# Patient Record
(demographics unavailable — no encounter records)

---

## 2025-01-07 NOTE — PHYSICAL EXAM
[No Proptosis] : no proptosis [No Lid Lag] : no lid lag [Thyroid Not Enlarged] : the thyroid was not enlarged [No Thyroid Nodules] : no palpable thyroid nodules [No Respiratory Distress] : no respiratory distress [Clear to Auscultation] : lungs were clear to auscultation bilaterally [Normal to Percussion] : lungs were normal to percussion [Normal PMI] : the apical impulse was normal [Normal Rate] : heart rate was normal [Regular Rhythm] : with a regular rhythm [No Edema] : no peripheral edema [Normal Bowel Sounds] : normal bowel sounds [Not Tender] : non-tender [Soft] : abdomen soft [Normal Gait] : normal gait [No Joint Swelling] : no joint swelling seen [Normal Reflexes] : deep tendon reflexes were 2+ and symmetric [No Tremors] : no tremors [Oriented x3] : oriented to person, place, and time [Normal Insight/Judgement] : insight and judgment were intact [de-identified] : significant bald spot on crown of head  Statement Selected

## 2025-01-07 NOTE — HISTORY OF PRESENT ILLNESS
[FreeTextEntry1] : Patient with long-standing history of hypothyroidism and  her dose was lowered from 125 to Synthroid 112 mcg daily. She is compliant with this medication. She has a small nodular goiter and describes no local neck discomfort or hoarseness. Her last sonogram in march 2019 was stable;this was done sooner because a PET scan done for w/u of lung nodule showed diffuse uptake in her thyroid.. She has noted thinning of her hair over the years and has been taking biotin but has stopped.She has mild hyperlipidemia. She was recently found to have an abnormally elevated kappa level and was diagnosed with IgM MGUS. Last visit her potassium was 5.8 so was repeated and is 5.2. BD does show osteoporosis and she did have a wrist and pelvic fracture.  She did not start risedronate.  Past few mos has had significant hair loss on scalp. Saw derm and has been receiving  scalp injections.   passed a way last year.

## 2025-01-07 NOTE — ASSESSMENT
[Levothyroxine] : The patient was instructed to take Levothyroxine on an empty stomach, separate from vitamins, and wait at least 30 minutes before eating [FreeTextEntry1] : The patient is clinically euthyroid and we will remain lower dose of Synthroid to 0.112 mg. She is currently off Biotin. Had been experiencing palpitations.  She has to repeat a thyroid ultrasound prior 1 in 2021 showed no nodules but some lymphadenopathy so we will repeat.  I have also strongly encouraged her to be compliant with her vitamin D since her vitamin D level was slightly low and is now better. We did talk about trying to adhere to a healthy lifestyle. She has lost some height, and a recent BD shows a lower T score. Will did a w/u for secondary causes of bone loss, and she was diagnosed with IgM MGUS.  She had agreed to start Actonel but never did.  Explained again BD c/w osteoporosis and she has had a wrist and pelvic fracture. Not planning on having dental work. 11/22 BD lowest T score L 2 -3.0.. Discussed Prol[facility] will consider. To repeat BD in December 2024.  Still needs to do.  Alopecia labs from derm show nl TSH, + TPO mild.  f/u 6 mos.

## 2025-01-07 NOTE — PHYSICAL EXAM
[No Proptosis] : no proptosis [No Lid Lag] : no lid lag [Thyroid Not Enlarged] : the thyroid was not enlarged [No Thyroid Nodules] : no palpable thyroid nodules [No Respiratory Distress] : no respiratory distress [Clear to Auscultation] : lungs were clear to auscultation bilaterally [Normal to Percussion] : lungs were normal to percussion [Normal PMI] : the apical impulse was normal [Normal Rate] : heart rate was normal [Regular Rhythm] : with a regular rhythm [No Edema] : no peripheral edema [Normal Bowel Sounds] : normal bowel sounds [Not Tender] : non-tender [Soft] : abdomen soft [Normal Gait] : normal gait [No Joint Swelling] : no joint swelling seen [Normal Reflexes] : deep tendon reflexes were 2+ and symmetric [No Tremors] : no tremors [Oriented x3] : oriented to person, place, and time [Normal Insight/Judgement] : insight and judgment were intact [de-identified] : significant bald spot on crown of head

## 2025-07-07 NOTE — ASSESSMENT
[Levothyroxine] : The patient was instructed to take Levothyroxine on an empty stomach, separate from vitamins, and wait at least 30 minutes before eating [FreeTextEntry1] : The patient is clinically euthyroid and we will remain lower dose of Synthroid to 0.112 mg. She is currently off Biotin. Had been experiencing palpitations.  She has to repeat a thyroid ultrasound prior 1 in 2021 showed no nodules but some lymphadenopathy so we repeated in February was stable. I have also strongly encouraged her to be compliant with her vitamin D since her vitamin D level was slightly low and is now better. We did talk about trying to adhere to a healthy lifestyle. She has lost some height, and a recent BD shows a lower T score. Will did a w/u for secondary causes of bone loss, and she was diagnosed with IgM MGUS.  She had agreed to start Actonel but never did.  Explained again BD c/w osteoporosis and she has had a wrist and pelvic fracture. Not planning on having dental work. 11/22 BD lowest T score L 2 -3.0..  She never started medication and T-score L2 now -2.5.  Would still recommend therapy but at this time she is not interested.    f/u 6 mos.

## 2025-07-07 NOTE — PHYSICAL EXAM
[No Proptosis] : no proptosis [No Lid Lag] : no lid lag [Thyroid Not Enlarged] : the thyroid was not enlarged [No Thyroid Nodules] : no palpable thyroid nodules [No Respiratory Distress] : no respiratory distress [Clear to Auscultation] : lungs were clear to auscultation bilaterally [Normal to Percussion] : lungs were normal to percussion [Normal PMI] : the apical impulse was normal [Normal Rate] : heart rate was normal [Regular Rhythm] : with a regular rhythm [No Edema] : no peripheral edema [Normal Bowel Sounds] : normal bowel sounds [Not Tender] : non-tender [Soft] : abdomen soft [Normal Gait] : normal gait [No Joint Swelling] : no joint swelling seen [Normal Reflexes] : deep tendon reflexes were 2+ and symmetric [No Tremors] : no tremors [Oriented x3] : oriented to person, place, and time [Normal Insight/Judgement] : insight and judgment were intact [de-identified] : significant bald spot on crown of head

## 2025-07-07 NOTE — PHYSICAL EXAM
[No Proptosis] : no proptosis [No Lid Lag] : no lid lag [Thyroid Not Enlarged] : the thyroid was not enlarged [No Thyroid Nodules] : no palpable thyroid nodules [No Respiratory Distress] : no respiratory distress [Clear to Auscultation] : lungs were clear to auscultation bilaterally [Normal to Percussion] : lungs were normal to percussion [Normal PMI] : the apical impulse was normal [Normal Rate] : heart rate was normal [Regular Rhythm] : with a regular rhythm [No Edema] : no peripheral edema [Normal Bowel Sounds] : normal bowel sounds [Not Tender] : non-tender [Soft] : abdomen soft [Normal Gait] : normal gait [No Joint Swelling] : no joint swelling seen [Normal Reflexes] : deep tendon reflexes were 2+ and symmetric [No Tremors] : no tremors [Oriented x3] : oriented to person, place, and time [Normal Insight/Judgement] : insight and judgment were intact [de-identified] : significant bald spot on crown of head